# Patient Record
Sex: FEMALE | Race: WHITE
[De-identification: names, ages, dates, MRNs, and addresses within clinical notes are randomized per-mention and may not be internally consistent; named-entity substitution may affect disease eponyms.]

---

## 2018-02-20 LAB
BASOPHILS # BLD: 0.05 K/UL (ref 0–0.2)
BASOPHILS NFR BLD: 0.4 %
BUN SERPL-MCNC: 14 MG/DL (ref 7–18)
CALCIUM SERPL-MCNC: 10.4 MG/DL (ref 8.5–10.1)
CO2 SERPL-SCNC: 31 MMOL/L (ref 21–32)
CREAT SERPL-MCNC: 0.88 MG/DL (ref 0.6–1.2)
EOS ABS #: 0.26 K/UL (ref 0–0.5)
EOSINOPHIL NFR BLD AUTO: 265 K/UL (ref 130–400)
GLUCOSE SERPL-MCNC: 128 MG/DL (ref 70–99)
HBA1C MFR BLD: 7.5 % (ref 4.5–5.6)
HCT VFR BLD CALC: 41.6 % (ref 37–47)
HGB BLD-MCNC: 14.3 G/DL (ref 12–16)
IG#: 0.21 K/UL (ref 0–0.02)
IMM GRANULOCYTES NFR BLD AUTO: 23.5 %
INR PPP: 1 (ref 0.9–1.1)
LYMPHOCYTES # BLD: 2.8 K/UL (ref 1.2–3.4)
MCH RBC QN AUTO: 33.9 PG (ref 25–34)
MCHC RBC AUTO-ENTMCNC: 34.4 G/DL (ref 32–36)
MCV RBC AUTO: 98.6 FL (ref 80–100)
MONO ABS #: 0.93 K/UL (ref 0.11–0.59)
MONOCYTES NFR BLD: 7.8 %
NEUT ABS #: 7.65 K/UL (ref 1.4–6.5)
NEUTROPHILS # BLD AUTO: 2.2 %
NEUTROPHILS NFR BLD AUTO: 64.3 %
PMV BLD AUTO: 9.8 FL (ref 7.4–10.4)
POTASSIUM SERPL-SCNC: 3.9 MMOL/L (ref 3.5–5.1)
PTT PATIENT: 25.2 SECONDS (ref 21–31)
RED CELL DISTRIBUTION WIDTH CV: 13.6 % (ref 11.5–14.5)
RED CELL DISTRIBUTION WIDTH SD: 48.4 FL (ref 36.4–46.3)
SODIUM SERPL-SCNC: 139 MMOL/L (ref 136–145)
WBC # BLD AUTO: 11.9 K/UL (ref 4.8–10.8)

## 2018-02-20 NOTE — PAT MEDICATION INSTRUCTIONS
Service Date


Feb 20, 2018.





Current Home Medication List


Allopurinol (Zyloprim), 300 MG PO QAM


Amlodipine (Norvasc), 10 MG PO QAM


Aspirin (Aspirin Ec), 81 MG PO QAM


Atorvastatin (Lipitor), 20 MG PO HS


Celecoxib (CeleBREX), 200 MG PO QAM


Cetirizine (Zyrtec), 10 MG PO QAM


Cyclobenzaprine Hcl (Flexeril), 10 MG PO TID PRN for PRN


Dapsone (Topical) (Aczone), 1 APPLN TOP PRN


Fluticasone Propionate (Nasal) (Flonase Allergy Relief), 2 SPRAYS INTNAS QAM


Gabapentin (Neurontin), 300 MG PO QID


Glipizide (Glipizide Er), 1 TAB PO BID


Insulin Detemir (Levemir), 26 UNITS SC HS


Levothyroxine Sodium (Levothyroxine Sodium), 1 TAB PO QAM


Lisinopril/Hctz (Zestoretic 20MG/12.5MG), 2 TAB PO QAM


Multivitamin (Multivitamin), 1 TAB PO QAM


Polyethylene Glycol-Propylene (Systane), 1 DROPS OP PRN


Tramadol (Ultram), 50 MG PO Q6H PRN for RN





Medication Instructions


For Your Scheduled Surgery 





- Check with surgeon for instructions: 


Celecoxib (CeleBREX), 200 MG PO QAM








- Hold the following medications 24 hours prior to surgery:


Dapsone (Topical) (Aczone), 1 APPLN TOP PRN








- Hold the following medications the morning of surgery:


Cetirizine (Zyrtec), 10 MG PO QAM


Cyclobenzaprine Hcl (Flexeril), 10 MG PO TID PRN for PRN


Glipizide (Glipizide Er), 1 TAB PO BID


Lisinopril/Hctz (Zestoretic 20MG/12.5MG), 2 TAB PO QAM


Multivitamin (Multivitamin), 1 TAB PO QAM








- Take the following medications the morning of surgery with a sip of water:


Allopurinol (Zyloprim), 300 MG PO QAM


Amlodipine (Norvasc), 10 MG PO QAM


Aspirin (Aspirin Ec), 81 MG PO QAM


Fluticasone Propionate (Nasal) (Flonase Allergy Relief), 2 SPRAYS INTNAS QAM


Gabapentin (Neurontin), 300 MG PO QID


Levothyroxine Sodium (Levothyroxine Sodium), 1 TAB PO QAM


Polyethylene Glycol-Propylene (Systane), 1 DROPS OP PRN (if needed)


Tramadol (Ultram), 50 MG PO Q6H PRN for RN (okay to take up to 4 hours prior to 

surgery if needed)








- Take the following medications as scheduled the night before surgery:


Atorvastatin (Lipitor), 20 MG PO HS


Cyclobenzaprine Hcl (Flexeril), 10 MG PO TID PRN for PRN (if needed)


Gabapentin (Neurontin), 300 MG PO QID


Glipizide (Glipizide Er), 1 TAB PO BID


Insulin Detemir (Levemir), 26 UNITS SC HS


Polyethylene Glycol-Propylene (Systane), 1 DROPS OP PRN (if needed)


Tramadol (Ultram), 50 MG PO Q6H PRN for RN (if needed)








If you have any questions please call us at [phone] or [phone] or 

[phone]

## 2018-03-15 NOTE — HISTORY & PHYSICAL EXAMINATION
DATE OF ADMISSION:  03/16/2018

 

CHIEF COMPLAINT:  Left knee pain.

 

HISTORY OF PRESENT ILLNESS:  Loni is a very pleasant 67-year-old female who

has been dealing with a several-year history of increasing left knee pain. 

X-rays and clinical examination were diagnostic for primary osteoarthritis of

the left knee.  She has failed extensive conservative treatment including

cortisone injections as well as Synvisc injections.  She is ambulating with a

cane.  After failing extensive conservative treatment, she elected to proceed

with a left total knee arthroplasty.

 

PAST MEDICAL HISTORY:  Significant for heart murmur, hypertension, sleep

apnea, insulin-dependent diabetes, hypothyroidism, osteoarthritis, obesity.

 

MEDICATIONS:  Include amlodipine, atorvastatin, lisinopril/HCTZ, Celebrex,

allopurinol, glipizide, gabapentin, tramadol, cyclobenzaprine, Synthroid,

aspirin, Flonase, Aczone, and Levemir.

 

ALLERGIES:  INCLUDE SULFA, CIPRO, METFORMIN.

 

PAST SURGICAL HISTORY:  Significant for tonsillectomy in 1952, rheumatic

fever in 1953, appendectomy in 1954, kidney stone removed in 1998 and 2005,

sepsis in 1998, right knee arthroscopy in 2004, and bilateral cataracts in

2016.

 

SOCIAL HISTORY:  She is .  She rarely drinks.  She denies any tobacco

or IV drug use.  She does a little activity at this time.  She has 2 kids.

 

FAMILY HISTORY:  Significant for heart disease and diabetes.

 

REVIEW OF SYSTEMS:  She complains mostly of left knee pain.  All other

pertinent review of systems is negative.

 

PHYSICAL EXAMINATION:

GENERAL:  She is awake, alert and orient x3.  She is no apparent distress. 

She is very pleasant.

HEENT:  Pupils are equal, round, and reactive to light.  Extraocular

movements are intact.  Oral mucosa is pink and moist.

HEART:  Regular rate per radial pulse.

LUNGS:  Anai symmetrically bilaterally with no audible breath sounds.

ABDOMEN:  Soft, nontender, nondistended.

MUSCULOSKELETAL:  On physical examination of the knee, she has good range of

motion from 0-120 degrees.  She has no instability.  She has severe

tenderness to palpation along the medial joint line and a slight varus

deformity.  She is otherwise neurovascularly intact.

 

IMAGING DATA:  X-rays of the knee do show advanced osteoarthritis with

complete collapse of the medial compartment.  There is bone on bone

articulation, joint space narrowing and osteophyte formation.

 

IMPRESSION:  Primary osteoarthritis of the left knee.

 

PLAN:  We will proceed with a Biomet Vanguard left total knee arthroplasty. 

Postoperatively, she will be placed on aspirin for DVT prophylaxis and kept

in the hospital for 2 midnights for postoperative medical management.

 

 

 

MALINA

## 2018-03-16 ENCOUNTER — HOSPITAL ENCOUNTER (INPATIENT)
Dept: HOSPITAL 45 - C.ACU | Age: 68
LOS: 2 days | Discharge: HOME HEALTH | DRG: 470 | End: 2018-03-18
Attending: ORTHOPAEDIC SURGERY | Admitting: ORTHOPAEDIC SURGERY
Payer: COMMERCIAL

## 2018-03-16 VITALS
HEIGHT: 64 IN | BODY MASS INDEX: 47.65 KG/M2 | WEIGHT: 279.11 LBS | HEIGHT: 64 IN | WEIGHT: 279.11 LBS | BODY MASS INDEX: 47.65 KG/M2

## 2018-03-16 VITALS
OXYGEN SATURATION: 91 % | HEART RATE: 86 BPM | TEMPERATURE: 98.24 F | DIASTOLIC BLOOD PRESSURE: 68 MMHG | SYSTOLIC BLOOD PRESSURE: 116 MMHG

## 2018-03-16 VITALS
HEART RATE: 84 BPM | TEMPERATURE: 98.24 F | SYSTOLIC BLOOD PRESSURE: 102 MMHG | OXYGEN SATURATION: 95 % | DIASTOLIC BLOOD PRESSURE: 62 MMHG

## 2018-03-16 VITALS
TEMPERATURE: 98.24 F | DIASTOLIC BLOOD PRESSURE: 67 MMHG | SYSTOLIC BLOOD PRESSURE: 112 MMHG | HEART RATE: 93 BPM | OXYGEN SATURATION: 91 %

## 2018-03-16 VITALS
OXYGEN SATURATION: 92 % | HEART RATE: 90 BPM | SYSTOLIC BLOOD PRESSURE: 102 MMHG | TEMPERATURE: 98.6 F | DIASTOLIC BLOOD PRESSURE: 66 MMHG

## 2018-03-16 VITALS — HEART RATE: 96 BPM | OXYGEN SATURATION: 96 % | SYSTOLIC BLOOD PRESSURE: 134 MMHG | DIASTOLIC BLOOD PRESSURE: 78 MMHG

## 2018-03-16 VITALS
SYSTOLIC BLOOD PRESSURE: 101 MMHG | HEART RATE: 81 BPM | DIASTOLIC BLOOD PRESSURE: 67 MMHG | OXYGEN SATURATION: 96 % | TEMPERATURE: 97.7 F

## 2018-03-16 VITALS
TEMPERATURE: 97.88 F | HEART RATE: 95 BPM | DIASTOLIC BLOOD PRESSURE: 69 MMHG | OXYGEN SATURATION: 91 % | SYSTOLIC BLOOD PRESSURE: 130 MMHG

## 2018-03-16 VITALS — SYSTOLIC BLOOD PRESSURE: 107 MMHG | OXYGEN SATURATION: 94 % | HEART RATE: 84 BPM | DIASTOLIC BLOOD PRESSURE: 65 MMHG

## 2018-03-16 VITALS
DIASTOLIC BLOOD PRESSURE: 56 MMHG | TEMPERATURE: 98.42 F | HEART RATE: 84 BPM | SYSTOLIC BLOOD PRESSURE: 90 MMHG | OXYGEN SATURATION: 90 %

## 2018-03-16 DIAGNOSIS — Z88.2: ICD-10-CM

## 2018-03-16 DIAGNOSIS — Z88.1: ICD-10-CM

## 2018-03-16 DIAGNOSIS — Z88.8: ICD-10-CM

## 2018-03-16 DIAGNOSIS — E11.9: ICD-10-CM

## 2018-03-16 DIAGNOSIS — Z83.3: ICD-10-CM

## 2018-03-16 DIAGNOSIS — M17.12: Primary | ICD-10-CM

## 2018-03-16 DIAGNOSIS — Z79.4: ICD-10-CM

## 2018-03-16 DIAGNOSIS — Z79.899: ICD-10-CM

## 2018-03-16 DIAGNOSIS — E03.9: ICD-10-CM

## 2018-03-16 DIAGNOSIS — Z79.82: ICD-10-CM

## 2018-03-16 DIAGNOSIS — E66.9: ICD-10-CM

## 2018-03-16 DIAGNOSIS — I10: ICD-10-CM

## 2018-03-16 PROCEDURE — 0SRD0J9 REPLACEMENT OF LEFT KNEE JOINT WITH SYNTHETIC SUBSTITUTE, CEMENTED, OPEN APPROACH: ICD-10-PCS | Performed by: ORTHOPAEDIC SURGERY

## 2018-03-16 RX ADMIN — GABAPENTIN SCH MG: 300 CAPSULE ORAL at 22:04

## 2018-03-16 RX ADMIN — CEFAZOLIN SCH MLS/MIN: 10 INJECTION, POWDER, FOR SOLUTION INTRAVENOUS at 23:40

## 2018-03-16 RX ADMIN — INSULIN ASPART SCH UNITS: 100 INJECTION, SOLUTION INTRAVENOUS; SUBCUTANEOUS at 13:25

## 2018-03-16 RX ADMIN — STANDARDIZED SENNA CONCENTRATE SCH MG: 8.6 TABLET ORAL at 20:24

## 2018-03-16 RX ADMIN — SODIUM CHLORIDE SCH MLS/HR: 900 INJECTION, SOLUTION INTRAVENOUS at 16:14

## 2018-03-16 RX ADMIN — INSULIN ASPART SCH UNITS: 100 INJECTION, SOLUTION INTRAVENOUS; SUBCUTANEOUS at 18:10

## 2018-03-16 RX ADMIN — KETOROLAC TROMETHAMINE SCH MG: 15 INJECTION INTRAMUSCULAR; INTRAVENOUS at 23:40

## 2018-03-16 RX ADMIN — DOCUSATE SODIUM SCH MG: 100 CAPSULE, LIQUID FILLED ORAL at 20:24

## 2018-03-16 RX ADMIN — ACETAMINOPHEN SCH MG: 500 TABLET, COATED ORAL at 13:38

## 2018-03-16 RX ADMIN — GABAPENTIN SCH MG: 300 CAPSULE ORAL at 18:01

## 2018-03-16 RX ADMIN — Medication SCH MG: at 20:24

## 2018-03-16 RX ADMIN — CEFAZOLIN SCH MLS/MIN: 10 INJECTION, POWDER, FOR SOLUTION INTRAVENOUS at 16:14

## 2018-03-16 RX ADMIN — ACETAMINOPHEN SCH MG: 500 TABLET, COATED ORAL at 22:06

## 2018-03-16 RX ADMIN — INSULIN ASPART SCH UNITS: 100 INJECTION, SOLUTION INTRAVENOUS; SUBCUTANEOUS at 22:14

## 2018-03-16 RX ADMIN — ATORVASTATIN CALCIUM SCH MG: 20 TABLET, FILM COATED ORAL at 20:24

## 2018-03-16 RX ADMIN — TRANEXAMIC ACID SCH MLS/HR: 100 INJECTION, SOLUTION INTRAVENOUS at 08:20

## 2018-03-16 RX ADMIN — KETOROLAC TROMETHAMINE SCH MG: 15 INJECTION INTRAMUSCULAR; INTRAVENOUS at 18:02

## 2018-03-16 RX ADMIN — TRANEXAMIC ACID SCH MLS/HR: 100 INJECTION, SOLUTION INTRAVENOUS at 06:30

## 2018-03-16 NOTE — ANESTHESIOLOGY PROGRESS NOTE
Anesthesia Post Op Note


Date & Time


Mar 16, 2018 at 11:03





Vital Signs


Pain Intensity:  0





Vital Signs Past 12 Hours








  Date Time  Temp Pulse Resp B/P (MAP) Pulse Ox O2 Delivery O2 Flow Rate FiO2


 


3/16/18 10:50 37.1 80 16 106/54 97 Oxymask 10 


 


3/16/18 10:40  79 16 103/58 95 Oxymask 10 


 


3/16/18 10:34 37.1 81 16 108/57 97 Oxymask 10 


 


3/16/18 07:00 36.6 95 20 130/69 91 Room Air  











Notes


Mental Status:  alert / awake / arousable, participated in evaluation


Pt Amnestic to Procedure:  Yes


Nausea / Vomiting:  adequately controlled


Pain:  adequately controlled


Airway Patency, RR, SpO2:  stable & adequate


BP & HR:  stable & adequate


Hydration State:  stable & adequate


Neuraxial Anesthesia:  was administered, sensory block is resolving


Anesthetic Complications:  no major complications apparent

## 2018-03-16 NOTE — OPERATIVE REPORT
DATE OF OPERATION:  03/16/2018

 

PREOPERATIVE DIAGNOSIS:  Primary osteoarthritis of the left knee.

 

POSTOPERATIVE DIAGNOSIS:  Same.

 

PROCEDURE:  Left total knee arthroplasty.

 

SURGEON:  Dr. Isidro Santana.

 

ASSISTANT:  Alphonso Gannon PA-C, whose assistance was necessary for

retraction and closure.

 

ANESTHESIA:  Spinal with a left adductor nerve block.

 

COMPLICATIONS:  None.

 

CONDITION:  Stable to PACU.

 

IMPLANTS USED:  I used a Biomet Vanguard left total knee arthroplasty system

with a size 65 left femur, a 75 tibia, a 34 patella, and a size 10 posterior

stabilized bearing.  All components were cemented with Palacos-G cement.

 

INDICATIONS:  Loni is a pleasant 67-year-old female who presented to my

office with chronic left knee pain.  X-rays and clinical examination were

diagnostic for primary osteoarthritis of the left knee.  After failing

conservative treatment, she elected to undergo a left total knee

arthroplasty.

 

OPERATION AND FINDINGS:  On 03/16/2018, she arrived at [facility]

for the above procedure.  She was seen in the preoperative holding area and

the operative extremity was identified and signed.  She was given a spinal

anesthetic and a left adductor nerve block.  She was taken back to the

operating room, laid on the table in supine position and put under basic

sedation.  The left knee was prepped and draped in sterile fashion.  Time-out

was done and the patient's operative extremity was properly identified.

 

A midline incision was made directly over the patella.  Dissection was taken

down to the extensor mechanism and a medial parapatellar arthrotomy was used.

 The medial retinaculum was released, the fat pad was left intact and the

knee was flexed.  ACL and PCL and meniscus were removed.  A drill was sent

down the center of the femoral canal, followed by an intramedullary lea.  Off

that lea, a distal femoral cutting block was placed.  A 12 mm was resected

off the distal femur at 5 degrees of valgus.  A posterior referencing guide

was used to measure the distal femur and it measured to be a size 65.  Two

drill holes were placed in 3 degrees of external rotation and a 4-in-1

cutting block was impacted into place.  Anterior, posterior and chamfer cuts

were then made.  The box cutting guide was then impacted into place and the

box was resected for the posterior stabilizing component.  The proximal tibia

was then exposed.  A drill was sent down the center of the tibial canal

followed by an intramedullary lea.  Off that lea, a proximal tibial resection

guide was placed and 2 mm was resected off the low medial side.  The tibia

measured to be a size 75.  It was set in the appropriate rotation, drilled

and then punched.  The posterior aspect of the knee was then opened up and

any soft tissue remnants were removed and osteophytes removed off the

posterior condyles.  Trial components were then placed as well as a size 10

polyethylene insert.  The knee was brought through a full range of motion and

felt to be stable.  The patella was then everted and 8.5 mm was resected off

the posterior aspect of the patella.  The patella measured to be a size 34

and 3 peg holes were drilled.  A trial patella was placed.  All trial

components were then removed.  Surrounding soft tissues were injected with

100 mL of an orthopedic pain control cocktail.  The final components were

then cemented in place with Palacos-G cement.  The size 10 posterior

stabilized bearing was then snapped into place and the anterior bar was

locked.  Two drains were placed.  The wound was irrigated with 3 liters of

normal saline solution with bacitracin.  The extensor mechanism was closed

with #2 FiberWire suture in the superior medial aspect and #1 Vicryl, both

proximally and distally.  Skin was closed with 2-0 Vicryl, 3-0 V-Loc suture

and staples.  She was then placed in a soft compressive dressing and taken to

the postanesthesia care in stable condition.  She tolerated the procedure

well.

 

 

I attest to the content of the Intraoperative Record and any orders documented therein. Any exception
s are noted below.

## 2018-03-16 NOTE — PHARMACY PROGRESS NOTE
Glycemic Control Intl Consult


Date of Service


Mar 16, 2018.





Scope


Glycemic Pharmacist consulted by Dr Santana on 3/16/18 for glycemic control and 

to write orders per [facility] inpatient glycemic control protocol





Objective


Weight (Kilograms):  126.600


Accuchecks BSG (last 24hrs):











Test


  3/16/18


06:48 3/16/18


10:44 3/16/18


11:56


 


Bedside Glucose


  169 mg/dl


(70-90) 156 mg/dl


(70-90) 168 mg/dl


(70-90)











Recent Pertinent Medications


Outpatient Anti-diabetic Regimen: 


* Levemir 26 units SC HS


* A1c = 7.5 % on 2/20/18








Risk Factors for Insulin Resistance:


* Steroids: Dexamethasone 4 mg IV x1 in OR


* Recent Surgery: POD 0 s/p L TKA


* Diet: T2DM





Assessment & Plan


ASSESSMENT:


* 66 yo F s/p TKA


* BSG's preop above goal - would prefer all BSG's 100-140 mg/dL in post-op 

patient


* Will utilize Levemir for basal insulin as this is what patient uses as 

outpatient.


 * Initial dose = full daily weight-based moderate stress dose


 * Supplemental Levemir this PM to increase to full daily weight-based severe 

stress dose if BSG's > 160 mg/dL


 * 1/2 daily weight based moderate stress tomorrow AM


 * Hope to resume outpatient Levemir dose tomorrow PM


* Novolog at weight-based severe stress 2nd surgical stress, steroids, and need 

for tight glycemic control post-op


 * Two overnight BSG checks


 * Stated goal range on order (120-150 mg/dL) slightly higher than actual 

desired BSG's of 100-140 mg/dL to prevent hypoglycemia for aggressive initial 

regimen.  May decrease tomorrow





PLAN FOR INPATIENT GLYCEMIC CONTROL:





* Holding outpatient oral diabetes medications





* Basal insulin with Levemir 


 * 45 units SQ x1 now


 * 15 units tonight if BSG > 160 mg/dL


 * 20 units tomorrow AM (decrease to 12 units for BSG less than 100 mg/dL, 

increase to 26 units for BSG > 140 mg/dL)





* Correctional Insulin with NOVOLOG per scale ACHS or Q6hrs while NPO - two 

additional overnight checks


 * Goal Range:  Low 120 mg/dL - High 150 mg/dL


 * Correction Factor: 15 mg/dL/unit


 * Nutritional / Prandial insulin per carb ratio of 1 unit per 4 grams CHO 

consumed





* Please note that the plan above was derived based on current level of insulin 

resistance and hospital stress. These recommendations are appropriate for 

inpatient admission only. Plan of care upon discharge will need to be 

reassessed to avoid potential outpatient hypo/hyperglycemia. 





Thank you.

## 2018-03-16 NOTE — [facility] POST OPERATIVE BRIEF NOTE
Immediate Operative Summary


Operative Date


Mar 16, 2018.





Pre-Operative Diagnosis





Primary osteoarthritis of the left knee





Post-Operative Diagnosis





Primary osteoarthritis of the left knee





Procedure(s) Performed





Left Total Knee Arthroplasty, Cemented





Surgeon


Dr. Isidro Santana





Assistant Surgeon(s)


Alphonso Gannon PA-C





Estimated Blood Loss


20ml





Findings


Consistent with Post-Op Diagnosis





Specimens





A: Left knee bone and tissue





Anesthesia Type


MAC Spinal Regional





Complication(s)


none





Disposition


Disposition:  Recovery Room / PACU

## 2018-03-17 VITALS
HEART RATE: 74 BPM | DIASTOLIC BLOOD PRESSURE: 76 MMHG | TEMPERATURE: 97.7 F | OXYGEN SATURATION: 92 % | SYSTOLIC BLOOD PRESSURE: 117 MMHG

## 2018-03-17 VITALS
HEART RATE: 76 BPM | OXYGEN SATURATION: 92 % | DIASTOLIC BLOOD PRESSURE: 70 MMHG | SYSTOLIC BLOOD PRESSURE: 108 MMHG | TEMPERATURE: 98.24 F

## 2018-03-17 VITALS
DIASTOLIC BLOOD PRESSURE: 68 MMHG | TEMPERATURE: 98.42 F | SYSTOLIC BLOOD PRESSURE: 114 MMHG | HEART RATE: 87 BPM | OXYGEN SATURATION: 90 %

## 2018-03-17 VITALS
TEMPERATURE: 97.52 F | DIASTOLIC BLOOD PRESSURE: 62 MMHG | OXYGEN SATURATION: 90 % | SYSTOLIC BLOOD PRESSURE: 130 MMHG | HEART RATE: 89 BPM

## 2018-03-17 VITALS
HEART RATE: 89 BPM | TEMPERATURE: 98.06 F | DIASTOLIC BLOOD PRESSURE: 75 MMHG | OXYGEN SATURATION: 93 % | SYSTOLIC BLOOD PRESSURE: 121 MMHG

## 2018-03-17 LAB
BUN SERPL-MCNC: 26 MG/DL (ref 7–18)
CALCIUM SERPL-MCNC: 8.8 MG/DL (ref 8.5–10.1)
CO2 SERPL-SCNC: 29 MMOL/L (ref 21–32)
CREAT SERPL-MCNC: 1.07 MG/DL (ref 0.6–1.2)
EOSINOPHIL NFR BLD AUTO: 246 K/UL (ref 130–400)
GLUCOSE SERPL-MCNC: 158 MG/DL (ref 70–99)
HCT VFR BLD CALC: 34.7 % (ref 37–47)
HGB BLD-MCNC: 11.9 G/DL (ref 12–16)
MCH RBC QN AUTO: 33.8 PG (ref 25–34)
MCHC RBC AUTO-ENTMCNC: 34.3 G/DL (ref 32–36)
MCV RBC AUTO: 98.6 FL (ref 80–100)
PMV BLD AUTO: 9.8 FL (ref 7.4–10.4)
POTASSIUM SERPL-SCNC: 4 MMOL/L (ref 3.5–5.1)
RED CELL DISTRIBUTION WIDTH CV: 13.5 % (ref 11.5–14.5)
RED CELL DISTRIBUTION WIDTH SD: 48.7 FL (ref 36.4–46.3)
SODIUM SERPL-SCNC: 142 MMOL/L (ref 136–145)
WBC # BLD AUTO: 20.35 K/UL (ref 4.8–10.8)

## 2018-03-17 RX ADMIN — AMLODIPINE BESYLATE SCH MG: 5 TABLET ORAL at 08:43

## 2018-03-17 RX ADMIN — FLUTICASONE PROPIONATE SCH SPRAYS: 50 SPRAY, METERED NASAL at 08:40

## 2018-03-17 RX ADMIN — KETOROLAC TROMETHAMINE SCH MG: 15 INJECTION INTRAMUSCULAR; INTRAVENOUS at 17:34

## 2018-03-17 RX ADMIN — INSULIN ASPART SCH UNITS: 100 INJECTION, SOLUTION INTRAVENOUS; SUBCUTANEOUS at 18:26

## 2018-03-17 RX ADMIN — GABAPENTIN SCH MG: 300 CAPSULE ORAL at 13:08

## 2018-03-17 RX ADMIN — LEVOTHYROXINE SODIUM SCH MCG: 50 TABLET ORAL at 05:42

## 2018-03-17 RX ADMIN — INSULIN DETEMIR SCH UNITS: 100 INJECTION, SOLUTION SUBCUTANEOUS at 21:44

## 2018-03-17 RX ADMIN — ACETAMINOPHEN SCH MG: 500 TABLET, COATED ORAL at 14:08

## 2018-03-17 RX ADMIN — OXYCODONE HYDROCHLORIDE PRN MG: 5 TABLET ORAL at 09:23

## 2018-03-17 RX ADMIN — OXYCODONE HYDROCHLORIDE PRN MG: 5 TABLET ORAL at 02:01

## 2018-03-17 RX ADMIN — GABAPENTIN SCH MG: 300 CAPSULE ORAL at 08:44

## 2018-03-17 RX ADMIN — OXYCODONE HYDROCHLORIDE PRN MG: 5 TABLET ORAL at 14:07

## 2018-03-17 RX ADMIN — DOCUSATE SODIUM SCH MG: 100 CAPSULE, LIQUID FILLED ORAL at 08:41

## 2018-03-17 RX ADMIN — STANDARDIZED SENNA CONCENTRATE SCH MG: 8.6 TABLET ORAL at 20:22

## 2018-03-17 RX ADMIN — Medication SCH MG: at 20:22

## 2018-03-17 RX ADMIN — INSULIN ASPART SCH UNITS: 100 INJECTION, SOLUTION INTRAVENOUS; SUBCUTANEOUS at 13:06

## 2018-03-17 RX ADMIN — CETIRIZINE HYDROCHLORIDE SCH MG: 10 TABLET, FILM COATED ORAL at 08:44

## 2018-03-17 RX ADMIN — INSULIN ASPART SCH UNITS: 100 INJECTION, SOLUTION INTRAVENOUS; SUBCUTANEOUS at 04:10

## 2018-03-17 RX ADMIN — ACETAMINOPHEN SCH MG: 500 TABLET, COATED ORAL at 21:46

## 2018-03-17 RX ADMIN — KETOROLAC TROMETHAMINE SCH MG: 15 INJECTION INTRAMUSCULAR; INTRAVENOUS at 12:57

## 2018-03-17 RX ADMIN — ACETAMINOPHEN SCH MG: 500 TABLET, COATED ORAL at 05:42

## 2018-03-17 RX ADMIN — Medication SCH MG: at 08:44

## 2018-03-17 RX ADMIN — SODIUM CHLORIDE SCH MLS/HR: 900 INJECTION, SOLUTION INTRAVENOUS at 12:30

## 2018-03-17 RX ADMIN — KETOROLAC TROMETHAMINE SCH MG: 15 INJECTION INTRAMUSCULAR; INTRAVENOUS at 05:41

## 2018-03-17 RX ADMIN — Medication SCH TAB: at 08:43

## 2018-03-17 RX ADMIN — INSULIN ASPART SCH UNITS: 100 INJECTION, SOLUTION INTRAVENOUS; SUBCUTANEOUS at 00:34

## 2018-03-17 RX ADMIN — SODIUM CHLORIDE SCH MLS/HR: 900 INJECTION, SOLUTION INTRAVENOUS at 04:08

## 2018-03-17 RX ADMIN — DOCUSATE SODIUM SCH MG: 100 CAPSULE, LIQUID FILLED ORAL at 20:22

## 2018-03-17 RX ADMIN — INSULIN ASPART SCH UNITS: 100 INJECTION, SOLUTION INTRAVENOUS; SUBCUTANEOUS at 21:41

## 2018-03-17 RX ADMIN — GABAPENTIN SCH MG: 300 CAPSULE ORAL at 17:34

## 2018-03-17 RX ADMIN — ALLOPURINOL SCH MG: 300 TABLET ORAL at 08:42

## 2018-03-17 RX ADMIN — LISINOPRIL AND HYDROCHLOROTHIAZIDE SCH TAB: 20; 12.5 TABLET ORAL at 08:42

## 2018-03-17 RX ADMIN — GABAPENTIN SCH MG: 300 CAPSULE ORAL at 20:22

## 2018-03-17 RX ADMIN — ATORVASTATIN CALCIUM SCH MG: 20 TABLET, FILM COATED ORAL at 20:22

## 2018-03-17 RX ADMIN — INSULIN ASPART SCH UNITS: 100 INJECTION, SOLUTION INTRAVENOUS; SUBCUTANEOUS at 09:18

## 2018-03-17 NOTE — PROGRESS NOTE
DATE: 03/17/2018

 

CHIEF COMPLAINT:  Status post left total knee arthroplasty postop day #1.

 

PROGRESS:  Loni was seen and examined at bedside today.  Overall, she is

doing fairly well.  She has lost soreness in her knee, but no other

complaints.  She has been up to use the bathroom, but has not been any

further than that at this point.

 

PHYSICAL EXAMINATION:

LEFT KNEE:  The dressing is clean and dry and the drain is to suction.  She

is lying with the knee in full extension.  She has active dorsiflexion and

plantarflexion of her left ankle and sensation is intact throughout.

 

LABORATORY DATA:  She has an H&H today of 11.9 and 34.7.  Her glucose is 158

and being managed by pharmacy.  Her vital signs are all stable on room air

and she is voiding on her own.

 

IMAGES:  X-rays postoperatively of the left knee showed the prosthesis to be

in anatomic alignment without any evidence of fracture, dislocation or

loosening.

 

IMPRESSION:  Status post left total knee arthroplasty postop day #1.

 

PLAN:  At this point, she is doing well and happy with her progress.  She is

having a little bit of pain, but we can manage that with the oral and IV pain

medications.  She will be seen by physical therapy today.  Pharmacy will

continue to manage her glucose and she is on aspirin for DVT prophylaxis.  I

will see her tomorrow morning and likely discharge her to home with [facility] health.

## 2018-03-17 NOTE — PHARMACY PROGRESS NOTE
Glycemic Control Progress Note


Date of Service


Mar 17, 2018.





Scope


Glycemic Pharmacist consulted for glycemic control to write orders per [facility] 

inpatient glycemic control protocol.





Objective


Accuchecks BSG (last 24hrs):











Test


  3/16/18


17:09 3/16/18


20:26 3/16/18


22:03 3/16/18


23:58


 


Bedside Glucose


  213 mg/dl


(70-90) 171 mg/dl


(70-90) 168 mg/dl


(70-90) 176 mg/dl


(70-90)


 


Test


  3/17/18


03:53 3/17/18


05:21 3/17/18


08:04 3/17/18


12:19


 


Bedside Glucose


  164 mg/dl


(70-90) 


  164 mg/dl


(70-90) 121 mg/dl


(70-90)


 


Random Glucose


  


  158 mg/dl


(70-99) 


  


 











Recent Pertinent Medications


Outpatient Anti-diabetic Regimen: 


* Levemir 26 units SC HS


* A1c = 7.5 % on 2/20/18








Risk Factors for Insulin Resistance:


* Steroids: Dexamethasone 4 mg IV x1 in OR ON 3/16


* Recent Surgery: POD 1 s/p L TKA


* Diet: T2DM





Outpatient Anti-Diabetic Meds


Oral agent


Basal insulin





Assessment & Plan


ASSESSMENT:





3/16/18


* 68 yo F s/p TKA


* BSG's preop above goal - would prefer all BSG's 100-140 mg/dL in post-op 

patient


* Will utilize Levemir for basal insulin as this is what patient uses as 

outpatient.


 * Initial dose = full daily weight-based moderate stress dose


 * Supplemental Levemir this PM to increase to full daily weight-based severe 

stress dose if BSG's > 160 mg/dL


 * 1/2 daily weight based moderate stress tomorrow AM


 * Hope to resume outpatient Levemir dose tomorrow PM


* Novolog at weight-based severe stress 2nd surgical stress, steroids, and need 

for tight glycemic control post-op


 * Two overnight BSG checks


 * Stated goal range on order (120-150 mg/dL) slightly higher than actual 

desired BSG's of 100-140 mg/dL to prevent hypoglycemia for aggressive initial 

regimen.  May decrease tomorrow





3/17/18


* BSG's slightly elevated post-op, but have ranged 121-176 mg/dL since 

yesterday @ HS


* Anticipate insulin sensitivity to increase and BSG's trended down from AM 

fasting to lunch - will therefore loosen correction factor and carb ratio


* Patient already received outpatient dose of Lantus this AM, but home dose 

admin qHS.  Will provide same Lantus dose as yesterday PM (if BSG's < 160 mg/dL

) or increase to home dose (if BSG's > 160 mg/dL)





PLAN FOR INPATIENT GLYCEMIC CONTROL:





* Holding outpatient oral diabetes medications





* Basal insulin with Levemir qHS


 * Hold if BSG < 110 mg/dL


 * 15 units if -160 mg/dL


 * 26 units if BSG > 160 mg/dL





* Correctional Insulin with NOVOLOG per scale ACHS or Q6hrs while NPO - two 

additional overnight checks


 * Goal Range:  Low 120 mg/dL - High 150 mg/dL


 * Correction Factor: 20 mg/dL/unit


 * Nutritional / Prandial insulin per carb ratio of 1 unit per 5 grams CHO 

consumed





* Please note that the plan above was derived based on current level of insulin 

resistance and hospital stress. These recommendations are appropriate for 

inpatient admission only. Plan of care upon discharge will need to be 

reassessed to avoid potential outpatient hypo/hyperglycemia. 





Thank you.

## 2018-03-18 VITALS
TEMPERATURE: 97.52 F | HEART RATE: 73 BPM | DIASTOLIC BLOOD PRESSURE: 80 MMHG | OXYGEN SATURATION: 91 % | SYSTOLIC BLOOD PRESSURE: 128 MMHG

## 2018-03-18 VITALS
DIASTOLIC BLOOD PRESSURE: 80 MMHG | OXYGEN SATURATION: 91 % | TEMPERATURE: 97.52 F | HEART RATE: 73 BPM | SYSTOLIC BLOOD PRESSURE: 128 MMHG

## 2018-03-18 RX ADMIN — OXYCODONE HYDROCHLORIDE PRN MG: 5 TABLET ORAL at 09:54

## 2018-03-18 RX ADMIN — KETOROLAC TROMETHAMINE SCH MG: 15 INJECTION INTRAMUSCULAR; INTRAVENOUS at 11:45

## 2018-03-18 RX ADMIN — DOCUSATE SODIUM SCH MG: 100 CAPSULE, LIQUID FILLED ORAL at 09:37

## 2018-03-18 RX ADMIN — CETIRIZINE HYDROCHLORIDE SCH MG: 10 TABLET, FILM COATED ORAL at 09:40

## 2018-03-18 RX ADMIN — INSULIN ASPART SCH UNITS: 100 INJECTION, SOLUTION INTRAVENOUS; SUBCUTANEOUS at 09:44

## 2018-03-18 RX ADMIN — LISINOPRIL AND HYDROCHLOROTHIAZIDE SCH TAB: 20; 12.5 TABLET ORAL at 09:39

## 2018-03-18 RX ADMIN — GABAPENTIN SCH MG: 300 CAPSULE ORAL at 09:38

## 2018-03-18 RX ADMIN — INSULIN DETEMIR SCH UNITS: 100 INJECTION, SOLUTION SUBCUTANEOUS at 09:45

## 2018-03-18 RX ADMIN — OXYCODONE HYDROCHLORIDE PRN MG: 5 TABLET ORAL at 02:04

## 2018-03-18 RX ADMIN — LEVOTHYROXINE SODIUM SCH MCG: 50 TABLET ORAL at 05:29

## 2018-03-18 RX ADMIN — Medication SCH TAB: at 09:38

## 2018-03-18 RX ADMIN — FLUTICASONE PROPIONATE SCH SPRAYS: 50 SPRAY, METERED NASAL at 09:36

## 2018-03-18 RX ADMIN — Medication SCH MG: at 09:38

## 2018-03-18 RX ADMIN — AMLODIPINE BESYLATE SCH MG: 5 TABLET ORAL at 09:39

## 2018-03-18 RX ADMIN — KETOROLAC TROMETHAMINE SCH MG: 15 INJECTION INTRAMUSCULAR; INTRAVENOUS at 00:25

## 2018-03-18 RX ADMIN — ACETAMINOPHEN SCH MG: 500 TABLET, COATED ORAL at 05:29

## 2018-03-18 RX ADMIN — KETOROLAC TROMETHAMINE SCH MG: 15 INJECTION INTRAMUSCULAR; INTRAVENOUS at 05:28

## 2018-03-18 RX ADMIN — ALLOPURINOL SCH MG: 300 TABLET ORAL at 09:39

## 2018-03-18 NOTE — PROGRESS NOTE
DATE: 03/18/2018

 

CHIEF COMPLAINT:  Status post left total knee arthroplasty, postop day #2.

 

PROGRESS:  Loni was seen and examined at bedside today.  Overall, she is

doing fairly well.  She was up and ambulating yesterday with physical

therapy.  She has not had a bowel movement yet and she was able to walk into

the hallway with therapy, but she tired easily.

 

PHYSICAL EXAMINATION:

LEFT KNEE:  The dressing has been changed.  The drain has been pulled.  Her

knee is out in full extension.  She has active dorsiflexion and

plantarflexion of her left ankle and sensation is intact throughout.

VITAL SIGNS:  All stable on room air.

 

She is voiding on her own.  She has not had a bowel movement yet.

 

IMPRESSION:  Status post left total knee arthroplasty, postop day #2.

 

PLAN:  She is doing about as well as expected.  She is passing gas and we can

give her some agents to help her move her bowels while at home.  She is

having some soreness in the knee, which is to be expected and her progress is

a little bit slow, but she is steady on her feet when she gets up.  She lives

in a 1-story ranch house with 1 step to get in.  She has [facility]

[facility] set up when she goes home.  We will discharge her to home today with

aspirin and oral pain medications.

## 2018-03-20 NOTE — DISCHARGE SUMMARY
DISCHARGE DIAGNOSIS:  Primary osteoarthritis of the left knee.

 

PROCEDURE:  Left total knee arthroplasty on 03/16/2018 by Dr. Isidro Santana.

 

DISCHARGE INSTRUCTIONS:

1.  Aspirin 325 mg twice a day for 6 weeks.

2.  MAYA hose stockings for 6 weeks.

3.  Oxycodone 5-10 mg every 4 hours as needed for pain.

4.  Senokot 2 tabs at night.

5.  Allopurinol 300 mg daily.

6.  Norvasc 10 mg daily.

7.  Lipitor 20 mg at night.

8.  Celebrex 200 mg daily.

9.  Zyrtec 10 mg daily.

10.  Flexeril 10 mg as needed.

11.  Flonase 2 sprays in the morning.

12.  Neurontin 300 mg 4 times a day.

13.  Glipizide 5 mg twice a day.

14.  Levemir 26 units at night.

15.  Synthroid 50 mcg daily.

16.  Zestoretic 20/12.5 mg 2 tabs daily.

17.  Daily multivitamin.

18.  Systane eyedrops.

19.  Ultram as needed for pain.

20.  Weightbear as tolerated.

21.  Follow up with Dr. Santana in 2 weeks.

22.  Call the office of Dr. Santana with any questions or concerns.

 

HOSPITAL COURSE:  Loni is a pleasant 67-year-old female who presented to my

office with chronic left knee pain.  X-rays and clinical examination were

diagnostic for primary osteoarthritis of the left knee.  After failing

conservative treatment, she elected to undergo a left total knee

arthroplasty.  On 03/16/2018 she arrived at [facility] and

underwent a left knee replacement without complications.  She had a spinal

anesthetic and a left adductor nerve block.  Postoperatively, she was placed

in a soft dressing and discharged to general orthopedic floor.  Her hospital

course was relatively uneventful.  On postop day #1 her H&H was stable at

11.9 and 34.7.  The pharmacy was managing her glucose levels.  She was a

little slow to walk with physical therapy but she was able to get up and out

into the hallway.  She was having a lot of pain in her knee.  On postop day

#2 the pain was getting a little bit better.  The nursing staff had changed

the dressing and pulled the drain.  She was able to ambulate some again with

physical therapy and was subsequently discharged to home with [email or web address] and the above instructions.

## 2023-09-29 NOTE — DISCHARGE INSTRUCTIONS
Discharge Instructions


Date of Service


Mar 16, 2018.





Admission


Reason for Admission:  Left Knee Degenerative Joint Disease





Discharge


Discharge Diagnosis / Problem:  Left Total Knee





Discharge Goals


Goal(s):  Decrease discomfort, Improve function





Activity Recommendations


Activity Limitations:  as noted below





Activity and Therapy Recommendations:





* If you are using [facility] then Physical Therapy will be provided 

until they feel you are ready to start Outpatient Physical Therapy.  If you are 

not using a Home Health agency then Outpatient Physical Therapy should start 

about 3-5 days from your day of surgery.  Therapy will last about 6-10 weeks





* It is important not to put a pillow under your knee when you are relaxing or 

sleeping. It is just as important to make sure you are getting your knee 

perfectly straight as it is to regain your knee bend.





* You were shown a series of exercises in the hospital. Do these exercises 

three times each day including the exercises you were shown in physical therapy.





* Get up and walk several times each day. For the first four weeks, try not to 

stand or walk for more than one hour at a time. If you do stand or walk for 

more than one hour, you will not hurt anything, but your leg will likely swell.





* As you feel comfortable, you may change from the walker or crutches to a cane 

and then to independent walking.





Medications:





* Narcotic  You will likely be sent home from the hospital with a prescription 

for the narcotic pain medication that worked best throughout your stay.





* Aspirin  Most patients will be required to take Aspirin 325mg twice a day for 

6 weeks after surgery.  This is obtained over-the-counter and a prescription is 

not necessary.  





* Other medications may be prescribed for specific circumstances.  If you have 

any questions, please call the office at [phone].





* Resume previous home medications unless otherwise instructed








TEDs/Elastic Stockings:





The white elastic stockings help limit swelling and prevent blood clots from 

forming in your legs.~ The more you wear them, the more they work.  Wear them 

for six weeks.








Showering:





You may shower 5 days from the day of surgery.  Let the soapy shower water run 

over the staples.  Do not scrub or soak the incision.





Things To Watch For:





* Drainage from the incision site that occurs more than one week after your 

surgery.





* Increased redness at the incision site.





* Fever above 102 degrees Fahrenheit.





* Unusual chest pain or shortness of breath.





* Call [facility] at [phone] with any of the above 

problems 





Follow-Up Visit:





Follow-up with Dr. Santana 2 weeks after your day of surgery.  An appointment 

was probably scheduled when you signed-up for surgery in the office.  If you 

have any questions call [phone]





Office Instructions:





More detailed instructions as well as Frequently Asked Questions were provided 

in a folder by our office when you signed-up for surgery.  Please review these 

instructions when you get home.  If you have any further questions or concerns, 

please feel free to call the office at [phone] 

















.





Instructions / Follow-Up


Instructions / Follow-Up








Current Hospital Diet


Patient's current hospital diet: Diabetes Type 2 Diet





Discharge Diet


Recommended Diet:  Diabetes Type 2 Diet





Procedures


Procedures Performed:  


Left Total Knee Arthroplasty, Cemented





Pending Studies


Studies pending at discharge:  no





Laboratory Results





Hemoglobin A1c








Test


  2/20/18


12:33 Range/Units


 


 


Estimated Average Glucose 169   mg/dl


 


Hemoglobin A1c 7.5 H 4.5-5.6  %











Medical Emergencies








.


Who to Call and When:





Medical Emergencies:  If at any time you feel your situation is an emergency, 

please call 911 immediately.





.





Non-Emergent Contact


Non-Emergency issues call your:  Surgeon


Call Non-Emergent contact if:  wound has increased drainage, wound has 

increased redness


.








"Provider Documentation" section prepared by Isidro Santana.








. Wound Cleaning and Dressings:    Wash your hands with soap and water. Always wear gloves while changing dressings. Donot touch wound / warner-wound skin with un-gloved hands. Remove old dressing, discard and place into trash. DRESSINGS: zinc barrier cream on affected areas of skin covering wund bed   Change dressing daily and after soiling    Off-Loading: EHOB cushion. Miscellaneous Instructions:  Supplement with a daily multivitamin   Increase protein intake / consider protein supplements - see below      DIETARY MODIFICATIONS TO HELP WITH WOUND HEALING:    Protein: Meats, beans, eggs, milk and yogurt particularly [place] yogurt), tofu, soy nuts, soy protein products    Vitamin C: Citrus fruits and juices, strawberries, tomatoes, tomato juice, peppers, baked potatoes, spinach, broccoli, cauliflower, [place] sprouts, cabbage    Vitamin A: Dark green, leafy vegetables, orange or yellow vegetables, cantaloupe, fortified dairy products, liver, fortified cereals    Zinc: Fortified cereals, red meats, seafood    Consider Jah by [employer] (These are essential branch chain amino acids that help with tissue building and wound healing) and take 2 packets/day. you can order online at [employer] or [employer] REMINDERS:    The treatment plan has been discussed at length with you and your provider. Follow all instructions carefully, it is very important. If you do not follow all instructions, you are at  risk of your wound not healing, infection, possible loss of limb and even end of life. Please call the clinic during regular business hours ( 7:30 AM - 5:30 PM) if you notice increased bleeding, redness, warmth, pain or pus like drainage or start running a fever greater than 100.3. For after hour emergencies, please call your primary physician or go to the nearest emergency room.